# Patient Record
Sex: FEMALE | Race: AMERICAN INDIAN OR ALASKA NATIVE
[De-identification: names, ages, dates, MRNs, and addresses within clinical notes are randomized per-mention and may not be internally consistent; named-entity substitution may affect disease eponyms.]

---

## 2022-01-22 ENCOUNTER — HOSPITAL ENCOUNTER (EMERGENCY)
Dept: HOSPITAL 5 - ED | Age: 77
LOS: 1 days | Discharge: HOME | End: 2022-01-23
Payer: MEDICARE

## 2022-01-22 DIAGNOSIS — E11.65: ICD-10-CM

## 2022-01-22 DIAGNOSIS — I10: ICD-10-CM

## 2022-01-22 DIAGNOSIS — R09.89: ICD-10-CM

## 2022-01-22 DIAGNOSIS — U07.1: Primary | ICD-10-CM

## 2022-01-22 DIAGNOSIS — M79.673: ICD-10-CM

## 2022-01-22 LAB
ALBUMIN SERPL-MCNC: 4.1 G/DL (ref 3.9–5)
ALT SERPL-CCNC: 14 UNITS/L (ref 7–56)
BUN SERPL-MCNC: 10 MG/DL (ref 7–17)
BUN/CREAT SERPL: 14 %
CALCIUM SERPL-MCNC: 8.7 MG/DL (ref 8.4–10.2)
HCT VFR BLD CALC: 36.3 % (ref 30.3–42.9)
HEMOLYSIS INDEX: 3
HGB BLD-MCNC: 11.7 GM/DL (ref 10.1–14.3)
INR PPP: 1.05 (ref 0.87–1.13)
MCHC RBC AUTO-ENTMCNC: 32 % (ref 30–34)
MCV RBC AUTO: 86 FL (ref 79–97)
PLATELET # BLD: 201 K/MM3 (ref 140–440)
RBC # BLD AUTO: 4.22 M/MM3 (ref 3.65–5.03)

## 2022-01-22 PROCEDURE — 71045 X-RAY EXAM CHEST 1 VIEW: CPT

## 2022-01-22 PROCEDURE — 73630 X-RAY EXAM OF FOOT: CPT

## 2022-01-22 PROCEDURE — 96361 HYDRATE IV INFUSION ADD-ON: CPT

## 2022-01-22 PROCEDURE — 93005 ELECTROCARDIOGRAM TRACING: CPT

## 2022-01-22 PROCEDURE — 96374 THER/PROPH/DIAG INJ IV PUSH: CPT

## 2022-01-22 PROCEDURE — 85610 PROTHROMBIN TIME: CPT

## 2022-01-22 PROCEDURE — 80053 COMPREHEN METABOLIC PANEL: CPT

## 2022-01-22 PROCEDURE — 99285 EMERGENCY DEPT VISIT HI MDM: CPT

## 2022-01-22 PROCEDURE — 36415 COLL VENOUS BLD VENIPUNCTURE: CPT

## 2022-01-22 PROCEDURE — 85025 COMPLETE CBC W/AUTO DIFF WBC: CPT

## 2022-01-22 PROCEDURE — 84484 ASSAY OF TROPONIN QUANT: CPT

## 2022-01-22 PROCEDURE — 85007 BL SMEAR W/DIFF WBC COUNT: CPT

## 2022-01-22 PROCEDURE — 82805 BLOOD GASES W/O2 SATURATION: CPT

## 2022-01-22 PROCEDURE — 93010 ELECTROCARDIOGRAM REPORT: CPT

## 2022-01-22 NOTE — EMERGENCY DEPARTMENT REPORT
HPI





- General


Chief Complaint: Hyperglycemia


Time Seen by Provider: 01/22/22 21:41





- HPI


HPI: 





76-year-old -American male presents to the emergency department via EMS 

with complaint of fatigue, generalized chest tightness, a mixed dry and 

productive cough, and some social issues.  The patient says that she lives with 

her son and helps him with his mortgage.  However, 1 week ago, "he locked me 

out."  The patient has spent the last 10 days jumping around trying to find 

places to sleep at night and stay out of the cold.  She paid for a hotel for 

about 5 days but her money ran out.  When she was locked out from her home she 

is also not had her medications.  She has a history of hypertension, 

hypothyroidism, non-insulin-dependent diabetes.  She was recently seen at [facility] and found to be positive for COVID-19 on 1/14.  This evening she says that

she went out into the cold and suddenly she started coughing and it hurt her 

chest.  She had an Accu-Chek in route that was about 350.  She denies any 

tobacco or illicit drug use.





ED Past Medical Hx





- Past Medical History


Hx Hypertension: Yes


Hx Diabetes: Yes





- Social History


Smoking Status: Never Smoker


Substance Use Type: None





- Medications


Home Medications: 


                                Home Medications











 Medication  Instructions  Recorded  Confirmed  Last Taken  Type


 


Albuterol Mdi (or & Nicu Only) 2 puff IH QID PRN #8.5 gram 01/23/22  Unknown Rx





[ProAir HFA Inhaler]     


 


Ibuprofen [Motrin 600 MG tab] 600 mg PO Q8H PRN #20 tablet 01/23/22  Unknown Rx














ED Review of Systems


ROS: 


Stated complaint: HYPERGLYCEMIC


Other details as noted in HPI





Comment: All other systems reviewed and negative


Constitutional: other (fatigue).  denies: chills, fever


Eyes: denies: eye pain, vision change


ENT: denies: ear pain, throat pain


Respiratory: cough.  denies: wheezing


Cardiovascular: chest pain.  denies: edema


Gastrointestinal: denies: abdominal pain, vomiting


Genitourinary: denies: dysuria, discharge


Musculoskeletal: denies: back pain, joint swelling


Skin: denies: rash, lesions


Neurological: denies: headache, numbness, paresthesias





Physical Exam





- Physical Exam


Physical Exam: 





GENERAL: The patient is well-developed well-nourished.


HENT: Normocephalic.  Atraumatic.    Patient has moist mucous membranes.


EYES: Extraocular motions are intact.


NECK: Supple. Trachea is midline.


CHEST/LUNGS: Mild rhonchi heard.  No tachypnea or accessory muscle use.  A dry 

cough heard during examination.  


HEART/CARDIOVASCULAR: Regular.  There is no tachycardia.  There is no murmur.


ABDOMEN: Abdomen is soft, nontender.  Patient has normal bowel sounds.  There is

no abdominal distention.


SKIN: Skin is warm and dry. 


NEURO: The patient is awake, alert, and oriented.  The patient is cooperative.  

The patient has no focal neurologic deficits.  Normal speech.


MUSCULOSKELETAL: There is some tenderness to palpation to the dorsal left foot 

without any obvious deformity.  +2/4 dorsalis pedis pulse and capillary refill 

less than 2 seconds.  There is no limitation range of motion.  





ED Medical Decision Making





- Lab Data


Result diagrams: 


                                 01/22/22 22:34





                                 01/22/22 22:34





                                   Lab Results











  01/22/22 01/22/22 01/22/22 Range/Units





  22:34 22:34 22:34 


 


WBC  4.6    (4.5-11.0)  K/mm3


 


RBC  4.22    (3.65-5.03)  M/mm3


 


Hgb  11.7    (10.1-14.3)  gm/dl


 


Hct  36.3    (30.3-42.9)  %


 


MCV  86    (79-97)  fl


 


MCH  28    (28-32)  pg


 


MCHC  32    (30-34)  %


 


RDW  13.7    (13.2-15.2)  %


 


Plt Count  201    (140-440)  K/mm3


 


PT   14.8   (12.2-14.9)  Sec.


 


INR   1.05   (0.87-1.13)  


 


VBG pH     (7.320-7.420)  


 


Sodium    138  (137-145)  mmol/L


 


Potassium    4.1  (3.6-5.0)  mmol/L


 


Chloride    100.4  ()  mmol/L


 


Carbon Dioxide    25  (22-30)  mmol/L


 


Anion Gap    17  mmol/L


 


BUN    10  (7-17)  mg/dL


 


Creatinine    0.7  (0.6-1.2)  mg/dL


 


Estimated GFR    > 60  ml/min


 


BUN/Creatinine Ratio    14  %


 


Glucose    385 H  ()  mg/dL


 


Calcium    8.7  (8.4-10.2)  mg/dL


 


Total Bilirubin    0.40  (0.1-1.2)  mg/dL


 


AST    14  (5-40)  units/L


 


ALT    14  (7-56)  units/L


 


Alkaline Phosphatase    100  ()  units/L


 


Troponin T    < 0.010  (0.00-0.029)  ng/mL


 


Total Protein    6.1 L  (6.3-8.2)  g/dL


 


Albumin    4.1  (3.9-5)  g/dL


 


Albumin/Globulin Ratio    2.1  %














  01/22/22 Range/Units





  22:34 


 


WBC   (4.5-11.0)  K/mm3


 


RBC   (3.65-5.03)  M/mm3


 


Hgb   (10.1-14.3)  gm/dl


 


Hct   (30.3-42.9)  %


 


MCV   (79-97)  fl


 


MCH   (28-32)  pg


 


MCHC   (30-34)  %


 


RDW   (13.2-15.2)  %


 


Plt Count   (140-440)  K/mm3


 


PT   (12.2-14.9)  Sec.


 


INR   (0.87-1.13)  


 


VBG pH  7.370  (7.320-7.420)  


 


Sodium   (137-145)  mmol/L


 


Potassium   (3.6-5.0)  mmol/L


 


Chloride   ()  mmol/L


 


Carbon Dioxide   (22-30)  mmol/L


 


Anion Gap   mmol/L


 


BUN   (7-17)  mg/dL


 


Creatinine   (0.6-1.2)  mg/dL


 


Estimated GFR   ml/min


 


BUN/Creatinine Ratio   %


 


Glucose   ()  mg/dL


 


Calcium   (8.4-10.2)  mg/dL


 


Total Bilirubin   (0.1-1.2)  mg/dL


 


AST   (5-40)  units/L


 


ALT   (7-56)  units/L


 


Alkaline Phosphatase   ()  units/L


 


Troponin T   (0.00-0.029)  ng/mL


 


Total Protein   (6.3-8.2)  g/dL


 


Albumin   (3.9-5)  g/dL


 


Albumin/Globulin Ratio   %














- EKG Data


-: EKG Interpreted by Me


EKG shows normal: sinus rhythm, axis, intervals, QRS complexes, ST-T waves


Rate: normal





- EKG Data


When compared to previous EKG there are: previous EKG unavailable


Interpretation: normal EKG





- Radiology Data


Radiology results: report reviewed





CHEST 1 VIEW 1/22/2022 10:00 PM  INDICATION / CLINICAL INFORMATION: Chest Pain. 

COMPARISON: None available.  FINDINGS:  SUPPORT DEVICES: None. HEART / 

MEDIASTINUM: No significant abnormality. LUNGS / PLEURA: There are nonspecific 

bibasilar opacities. No significant pleural effusion. No pneumothorax.  

ADDITIONAL FINDINGS: No significant additional findings.  IMPRESSION: Bibasilar 

opacities are favored to represent atelectasis. Pneumonia is a less likely 

consideration.





Left foot 3 views  INDICATION: Foot pain  FINDINGS: Degenerative changes great 

toe MTP joint. Midfoot alignment appears normal. Calcaneal spurring is noted. 

Postsurgical change in the calcaneus. No displaced fracture.





- Medical Decision Making





This patient presents with some generalized chest tightness, cough, fatigue, 

foot pain.  Patient was found to be COVID-positive about 1 week ago.





She was found with EMS to be hyperglycemic and she admits to some recent 

noncompliance with her medications after she was allegedly locked out of her 

residence by her son.





Patient's labs are mostly unremarkable including CBC, metabolic panel, negative 

troponin, except for hyperglycemia with a blood sugar of about 385.  There is no

significant elevation in the anion gap, and no venous acidosis, and therefore 

the patient does not have diabetic ketoacidosis.  She was given IV fluid 

resuscitation and a dose of IV insulin.  Upon reevaluation her blood sugar came 

down to about 170.





EKG does not show any morphology consistent with ST elevation myocardial 

infarction.





Chest x-ray does not show any significant pneumonia, no pneumothorax, no widened

mediastinum.


X-ray of the left foot does not show any fracture, dislocation, or any acute 

process.





The chest tightness does not appear consistent with ACS and appears to be more 

of a respiratory/pulmonary issue.





Vital signs reassuring including being afebrile.  For all these reasons patient 

appears safe for discharge home at this time.  She already has a prescription 

that she recently filled for Tessalon Perles.  I have given her a prescription 

for an albuterol inhaler.  She has been instructed to follow-up with her primary

care physician in the next few days and to return to the closest emergency 

department with any worsening of her symptoms or with any acute distress.


Critical Care Time: No


Critical care attestation.: 


If time is entered above; I have spent that time in minutes in the direct care 

of this critically ill patient, excluding procedure time.








ED Disposition


Clinical Impression: 


 COVID-19, Chest congestion





Hyperglycemia due to type 2 diabetes mellitus


Qualifiers:


 Diabetes mellitus long term insulin use: without long term use Qualified 

Code(s): E11.65 - Type 2 diabetes mellitus with hyperglycemia





Foot pain


Qualifiers:


 Laterality: unspecified laterality Qualified Code(s): M79.673 - Pain in 

unspecified foot





Disposition: 01 HOME / SELF CARE / HOMELESS


Is pt being admited?: No


Condition: Stable


Instructions:  COVID-19, Hyperglycemia, Foot Pain, Diabetes Mellitus Type 2 in 

Adults (ED)


Additional Instructions: 


Please follow-up with your primary care physician in the next few days.





I am giving you a referral for a local podiatrist, Dr. Canas, to follow-up 

regarding your foot pain.





Take your diabetes medications as previously prescribed.  Try to stay away from 

foods that are high in sugar, carbohydrates and starches.  Keep a blood sugar 

log.





Return to the emergency department with any worsening of your symptoms, new or 

concerning symptoms not addressed during this current emergency department 

visit, or with any acute distress.


Prescriptions: 


Ibuprofen [Motrin 600 MG tab] 600 mg PO Q8H PRN #20 tablet


 PRN Reason: Pain


Albuterol Mdi (or & Nicu Only) [ProAir HFA Inhaler] 2 puff IH QID PRN #8.5 gram


 PRN Reason: Shortness Of Breath


Referrals: 


OZ CHRISTINA MD [Primary Care Provider] - 2-3 Days


Time of Disposition: 01:13

## 2022-01-22 NOTE — XRAY REPORT
CHEST 1 VIEW 1/22/2022 10:00 PM



INDICATION / CLINICAL INFORMATION:

Chest Pain.



COMPARISON: 

None available.



FINDINGS:



SUPPORT DEVICES: None.

HEART / MEDIASTINUM: No significant abnormality. 

LUNGS / PLEURA: There are nonspecific bibasilar opacities. No significant pleural effusion. No pneumo
thorax. 



ADDITIONAL FINDINGS: No significant additional findings.



IMPRESSION:

Bibasilar opacities are favored to represent atelectasis. Pneumonia is a less likely consideration.



Signer Name: Thomas Eric MD 

Signed: 1/22/2022 10:15 PM

Workstation Name: [id number]

## 2022-01-23 VITALS — DIASTOLIC BLOOD PRESSURE: 87 MMHG | SYSTOLIC BLOOD PRESSURE: 128 MMHG

## 2022-01-23 LAB
ANISOCYTOSIS BLD QL SMEAR: (no result)
BAND NEUTROPHILS # (MANUAL): 0 K/MM3
MYELOCYTES # (MANUAL): 0 K/MM3
PROMYELOCYTES # (MANUAL): 0 K/MM3
TOTAL CELLS COUNTED BLD: 100

## 2022-01-23 NOTE — XRAY REPORT
Left foot 3 views



INDICATION: Foot pain



FINDINGS: Degenerative changes great toe MTP joint. Midfoot alignment appears normal. Calcaneal spurr
ing is noted. Postsurgical change in the calcaneus. No displaced fracture.



Signer Name: Keith Orourke MD 

Signed: 1/23/2022 12:24 AM

Workstation Name: [email or web address]

## 2022-01-27 NOTE — ELECTROCARDIOGRAPH REPORT
[facility]

                                       

Test Date:    2022               Test Time:    22:25:10

Pat Name:     LISSY DARLING               Department:   

Patient ID:   [id number]          Room:          

Gender:       F                        Technician:   [email or web address]

:          1945               Requested By: AMBROSE LOUIS

Order Number: [id number]              Reading MD:   Evan Shoemaker

                                 Measurements

Intervals                              Axis          

Rate:         70                       P:            60

SD:           150                      QRS:          26

QRSD:         82                       T:            46

QT:           370                                    

QTc:          400                                    

                           Interpretive Statements

Sinus rhythm

No previous ECG available for comparison

Electronically Signed On 2022 13:23:51 EST by Evan Shoemaker